# Patient Record
Sex: FEMALE | Race: OTHER | Employment: UNEMPLOYED
[De-identification: names, ages, dates, MRNs, and addresses within clinical notes are randomized per-mention and may not be internally consistent; named-entity substitution may affect disease eponyms.]

---

## 2023-11-29 ENCOUNTER — HOSPITAL ENCOUNTER (EMERGENCY)
Facility: HOSPITAL | Age: 18
Discharge: PSYCHIATRIC OR SUBSTANCE USE FACILITY | End: 2023-11-30
Attending: EMERGENCY MEDICINE

## 2023-11-29 DIAGNOSIS — F20.9 SCHIZOPHRENIA, UNSPECIFIED TYPE (HCC): Primary | ICD-10-CM

## 2023-11-29 LAB
ALBUMIN SERPL-MCNC: 4.5 G/DL (ref 3.4–5)
ALBUMIN/GLOB SERPL: 1.2 (ref 0.8–1.7)
ALP SERPL-CCNC: 80 U/L (ref 45–117)
ALT SERPL-CCNC: 15 U/L (ref 13–56)
AMPHET UR QL SCN: NEGATIVE
ANION GAP SERPL CALC-SCNC: 2 MMOL/L (ref 3–18)
AST SERPL-CCNC: 9 U/L (ref 10–38)
BARBITURATES UR QL SCN: NEGATIVE
BASOPHILS # BLD: 0.1 K/UL (ref 0–0.1)
BASOPHILS NFR BLD: 1 % (ref 0–2)
BENZODIAZ UR QL: NEGATIVE
BILIRUB SERPL-MCNC: 1 MG/DL (ref 0.2–1)
BUN SERPL-MCNC: 9 MG/DL (ref 7–18)
BUN/CREAT SERPL: 10 (ref 12–20)
CALCIUM SERPL-MCNC: 9.8 MG/DL (ref 8.5–10.1)
CANNABINOIDS UR QL SCN: NEGATIVE
CHLORIDE SERPL-SCNC: 108 MMOL/L (ref 100–111)
CO2 SERPL-SCNC: 30 MMOL/L (ref 21–32)
COCAINE UR QL SCN: NEGATIVE
CREAT SERPL-MCNC: 0.88 MG/DL (ref 0.6–1.3)
DIFFERENTIAL METHOD BLD: NORMAL
EOSINOPHIL # BLD: 0.1 K/UL (ref 0–0.4)
EOSINOPHIL NFR BLD: 2 % (ref 0–5)
ERYTHROCYTE [DISTWIDTH] IN BLOOD BY AUTOMATED COUNT: 13.2 % (ref 11.6–14.5)
ETHANOL SERPL-MCNC: <3 MG/DL (ref 0–3)
FLUAV RNA SPEC QL NAA+PROBE: NOT DETECTED
FLUBV RNA SPEC QL NAA+PROBE: NOT DETECTED
GLOBULIN SER CALC-MCNC: 3.9 G/DL (ref 2–4)
GLUCOSE SERPL-MCNC: 88 MG/DL (ref 74–99)
HCG SERPL QL: NEGATIVE
HCT VFR BLD AUTO: 43.4 % (ref 35–45)
HGB BLD-MCNC: 14.1 G/DL (ref 12–16)
IMM GRANULOCYTES # BLD AUTO: 0 K/UL (ref 0–0.04)
IMM GRANULOCYTES NFR BLD AUTO: 0 % (ref 0–0.5)
LYMPHOCYTES # BLD: 1.9 K/UL (ref 0.9–3.6)
LYMPHOCYTES NFR BLD: 29 % (ref 21–52)
Lab: NORMAL
MCH RBC QN AUTO: 30.1 PG (ref 24–34)
MCHC RBC AUTO-ENTMCNC: 32.5 G/DL (ref 31–37)
MCV RBC AUTO: 92.5 FL (ref 78–100)
METHADONE UR QL: NEGATIVE
MONOCYTES # BLD: 0.3 K/UL (ref 0.05–1.2)
MONOCYTES NFR BLD: 4 % (ref 3–10)
NEUTS SEG # BLD: 4.2 K/UL (ref 1.8–8)
NEUTS SEG NFR BLD: 64 % (ref 40–73)
NRBC # BLD: 0 K/UL (ref 0–0.01)
NRBC BLD-RTO: 0 PER 100 WBC
OPIATES UR QL: NEGATIVE
PCP UR QL: NEGATIVE
PLATELET # BLD AUTO: 252 K/UL (ref 135–420)
PMV BLD AUTO: 10.6 FL (ref 9.2–11.8)
POTASSIUM SERPL-SCNC: 4.6 MMOL/L (ref 3.5–5.5)
PROT SERPL-MCNC: 8.4 G/DL (ref 6.4–8.2)
RBC # BLD AUTO: 4.69 M/UL (ref 4.2–5.3)
SARS-COV-2 RNA RESP QL NAA+PROBE: NOT DETECTED
SODIUM SERPL-SCNC: 140 MMOL/L (ref 136–145)
WBC # BLD AUTO: 6.5 K/UL (ref 4.6–13.2)

## 2023-11-29 PROCEDURE — 94761 N-INVAS EAR/PLS OXIMETRY MLT: CPT

## 2023-11-29 PROCEDURE — 85025 COMPLETE CBC W/AUTO DIFF WBC: CPT

## 2023-11-29 PROCEDURE — 80307 DRUG TEST PRSMV CHEM ANLYZR: CPT

## 2023-11-29 PROCEDURE — 84703 CHORIONIC GONADOTROPIN ASSAY: CPT

## 2023-11-29 PROCEDURE — 80053 COMPREHEN METABOLIC PANEL: CPT

## 2023-11-29 PROCEDURE — 87636 SARSCOV2 & INF A&B AMP PRB: CPT

## 2023-11-29 PROCEDURE — 99285 EMERGENCY DEPT VISIT HI MDM: CPT

## 2023-11-29 PROCEDURE — 82077 ASSAY SPEC XCP UR&BREATH IA: CPT

## 2023-11-29 PROCEDURE — 6370000000 HC RX 637 (ALT 250 FOR IP): Performed by: STUDENT IN AN ORGANIZED HEALTH CARE EDUCATION/TRAINING PROGRAM

## 2023-11-29 RX ORDER — HYDROXYZINE HYDROCHLORIDE 25 MG/1
25 TABLET, FILM COATED ORAL
Status: COMPLETED | OUTPATIENT
Start: 2023-11-29 | End: 2023-11-29

## 2023-11-29 RX ADMIN — HYDROXYZINE HYDROCHLORIDE 25 MG: 25 TABLET, FILM COATED ORAL at 23:05

## 2023-11-29 ASSESSMENT — PAIN - FUNCTIONAL ASSESSMENT: PAIN_FUNCTIONAL_ASSESSMENT: NONE - DENIES PAIN

## 2023-11-29 NOTE — ED TRIAGE NOTES
Patient states auditory hallucinations telling her to harm herself with no plan, states for \"a while\" states, last admission 1 year ago. Patient expresses wish to get a job but voices will not stop. Patient is here with mom, Welsh speaking asking for a , patient speaks Terri Figueroa. Patient is calm and cooperative.

## 2023-11-29 NOTE — ED NOTES
set up at bedside. Pt's mother, who is Divehi speaking, would like to talk to provider. Pt consented to mother talking to doctor, but does not want her medical information given to mother. Provider at bedside.      Josie Melara, SAUMYA  11/29/23 4461

## 2023-11-29 NOTE — ED PROVIDER NOTES
EMERGENCY DEPARTMENT HISTORY AND PHYSICAL EXAM      Date: 11/29/2023  Patient Name: Paulina Hernandez    History of Presenting Illness     Chief Complaint   Patient presents with    Mental Health Problem    Hallucinations     Auditory        History Provided By: Patient    HPI: Paulina Hernandez, 19 y.o. female with PMHx as noted below presents the emergency department for mental health evaluation. Patient reports history of schizophrenia and states that she used to receive periodic injections while she was living in [place], cannot over the name of the medication. Patient states that since moving down here with her mother she has not had any psychiatric care and notes that she has been hearing voices lately. She states the voices have not been telling her to do anything specific but states they are \"annoying\". Otherwise denies any suicidal or homicidal thoughts. Pt denies any other alleviating or exacerbating factors. Additionally, pt specifically denies any recent fever, chills, headache, nausea, vomiting, abdominal pain, CP, SOB, lightheadedness, dizziness, cough, congestion, diarrhea    PCP: No primary care provider on file. No current facility-administered medications for this encounter. No current outpatient medications on file. Past History     Past Medical History:  Schizophrenia     Past Surgical History:  No past surgical history on file. Family History:  No family history on file. Social History: Allergies:  No Known Allergies      Review of Systems   Review of Systems    Physical Exam   Physical Exam    GENERAL: alert and oriented, no acute distress  EYES: PEERL, No injection, discharge or icterus. ENT: Mucous membranes pink and moist.  NECK: Supple  LUNGS: Airway patent. Non-labored respirations. Breath sounds clear with good air entry bilaterally. HEART: Regular rate and rhythm.  No peripheral edema  MSK/EXTREMITIES: Without swelling, tenderness or deformity,

## 2023-11-29 NOTE — ED NOTES
Pt given starry drink in styrofoam cup. Mother at bedside.      Yossi Acevedo, April, RN  11/29/23 0457

## 2023-11-30 VITALS
WEIGHT: 172.8 LBS | TEMPERATURE: 97.9 F | DIASTOLIC BLOOD PRESSURE: 70 MMHG | HEART RATE: 83 BPM | SYSTOLIC BLOOD PRESSURE: 115 MMHG | OXYGEN SATURATION: 98 % | RESPIRATION RATE: 16 BRPM | BODY MASS INDEX: 30.62 KG/M2 | HEIGHT: 63 IN

## 2023-11-30 NOTE — ED NOTES
Called report to [facility] and got address [phone] [place] [facility] on the door. Spoke with [employer].  To set up transportation     Su Zavala  11/30/23 1180

## 2023-11-30 NOTE — ED NOTES
Mmt to transport pt to facility after 0730 per fabi sec.  Signed form for transportation     Radha Leija, [address]  11/30/23 0981

## 2023-11-30 NOTE — DISCHARGE INSTRUCTIONS
You were evaluated for schizophrenia . Based on your work-up it was deemed that she was stable for discharge. .  Please follow-up with your primary care physician if you have any further concerns and go over your work-up. If you experience any chest pain, shortness of breath, worsening abdominal pain, vomiting blood, worsening headache, seizures, or any worsening of your symptoms please return to the emergency department immediately. If you have any pending results or any further questions please contact the emergency department at [phone].

## 2023-11-30 NOTE — PROGRESS NOTES
Crisis Note: Clinicals submitted to [facility] for review; awaiting response.

## 2023-11-30 NOTE — ED NOTES
Report given to [facility] facility by night shift RN, per EMTALA form. Emtala completed and given to medical transport. Pt alert and oriented, in no distress, denies pain.       Gaby An RN  11/30/23 2804

## 2023-11-30 NOTE — ED NOTES
Pt taken to [facility] via medical transport. PT stable. Vitals stable.       Lauri Nelson RN  11/30/23 8137

## 2023-11-30 NOTE — BSMART NOTE
Crisis Note: Writer spoke with Maksim with [facility], [phone], who reports patient has been accepted by Dr. Ethan Aviles. Maksim requested patient arrive to facility after 07:30 am today. Nurse to call report to [phone]. Dr. Bebeto Dhaliwal and charge nurse made aware of disposition.
Crisis Note: Writer [facility] with [facility] regarding [facility].

## 2023-11-30 NOTE — ED NOTES
Pt A+Ox4. Pt denies SI/HI/VH. +AH. States she is hearing voices. Denies that the voices are telling her to harm herself. Pt vitals stable at this time. Pt to be transferred to [facility]. Transportation set up on previous shift. Pt has no requests at this time. Breakfast tray at bedside.         Paloma Lindsey RN  11/30/23 0599

## 2023-11-30 NOTE — PROGRESS NOTES
Crisis Note: Writer spoke with Maksim with [facility] [phone], regarding referral. Marce Russ reports she will call back after clinicals have been reviewed.

## 2023-11-30 NOTE — ED NOTES
ED Course as of 11/30/23 0821   Thu Nov 30, 2023   0608 Transfer to [facility]  [KS]   0820 As to [place] 20 yo female pmh of schizophrenia cc- auditory hallucinations/ workup- negative and medically cleared/ plan- pt will be discharged to crisis stabilization center.  [KS]      ED Course User Index  [KS] MD Prince Jesi Rodgers MD  11/30/23 8409